# Patient Record
Sex: MALE | Race: OTHER | HISPANIC OR LATINO | URBAN - METROPOLITAN AREA
[De-identification: names, ages, dates, MRNs, and addresses within clinical notes are randomized per-mention and may not be internally consistent; named-entity substitution may affect disease eponyms.]

---

## 2018-04-22 VITALS
WEIGHT: 169.98 LBS | RESPIRATION RATE: 16 BRPM | TEMPERATURE: 97 F | DIASTOLIC BLOOD PRESSURE: 62 MMHG | HEART RATE: 89 BPM | OXYGEN SATURATION: 94 % | SYSTOLIC BLOOD PRESSURE: 105 MMHG

## 2018-04-22 PROCEDURE — 99285 EMERGENCY DEPT VISIT HI MDM: CPT | Mod: 25

## 2018-04-22 PROCEDURE — 93010 ELECTROCARDIOGRAM REPORT: CPT

## 2018-04-22 RX ORDER — TETANUS TOXOID, REDUCED DIPHTHERIA TOXOID AND ACELLULAR PERTUSSIS VACCINE, ADSORBED 5; 2.5; 8; 8; 2.5 [IU]/.5ML; [IU]/.5ML; UG/.5ML; UG/.5ML; UG/.5ML
0.5 SUSPENSION INTRAMUSCULAR ONCE
Qty: 0 | Refills: 0 | Status: COMPLETED | OUTPATIENT
Start: 2018-04-22 | End: 2018-04-22

## 2018-04-22 NOTE — ED ADULT TRIAGE NOTE - ARRIVAL INFO ADDITIONAL COMMENTS
[place], pt admits "he drank too much" and reports fall. noted with dried blood to mouth, hematoma to right side [place], [place], pt admits "he drank too much" and reports fall. noted with dried blood to mouth, hematoma to right side head. aaox1 with slurred [place], [place] per [place], pt stated "i drank too much" and reports fall. noted with dried blood to mouth, hematoma to right side occipital head. aaox1 to last name, with slurred speech

## 2018-04-23 ENCOUNTER — INPATIENT (INPATIENT)
Facility: HOSPITAL | Age: 48
LOS: 1 days | Discharge: HOME | DRG: 85 | End: 2018-04-25
Attending: PSYCHIATRY & NEUROLOGY | Admitting: PSYCHIATRY & NEUROLOGY
Payer: COMMERCIAL

## 2018-04-23 DIAGNOSIS — S01.01XA LACERATION WITHOUT FOREIGN BODY OF SCALP, INITIAL ENCOUNTER: ICD-10-CM

## 2018-04-23 DIAGNOSIS — F10.129 ALCOHOL ABUSE WITH INTOXICATION, UNSPECIFIED: ICD-10-CM

## 2018-04-23 DIAGNOSIS — R40.2411 GLASGOW COMA SCALE SCORE 13-15, IN THE FIELD [EMT OR AMBULANCE]: ICD-10-CM

## 2018-04-23 DIAGNOSIS — S06.350A TRAUMATIC HEMORRHAGE OF LEFT CEREBRUM WITHOUT LOSS OF CONSCIOUSNESS, INITIAL ENCOUNTER: ICD-10-CM

## 2018-04-23 DIAGNOSIS — I62.9 NONTRAUMATIC INTRACRANIAL HEMORRHAGE, UNSPECIFIED: ICD-10-CM

## 2018-04-23 DIAGNOSIS — S01.511A LACERATION WITHOUT FOREIGN BODY OF LIP, INITIAL ENCOUNTER: ICD-10-CM
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
## 2018-04-23 DIAGNOSIS — Z23 ENCOUNTER FOR IMMUNIZATION: ICD-10-CM
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
## 2018-04-23 DIAGNOSIS — Z29.9 ENCOUNTER FOR PROPHYLACTIC MEASURES, UNSPECIFIED: ICD-10-CM

## 2018-04-23 DIAGNOSIS — F10.10 ALCOHOL ABUSE, UNCOMPLICATED: ICD-10-CM

## 2018-04-23 DIAGNOSIS — G93.40 ENCEPHALOPATHY, UNSPECIFIED: ICD-10-CM

## 2018-04-23 LAB
ALBUMIN SERPL ELPH-MCNC: 4.5 G/DL — SIGNIFICANT CHANGE UP (ref 3.3–5)
ALP SERPL-CCNC: 106 U/L — SIGNIFICANT CHANGE UP (ref 40–120)
ALT FLD-CCNC: 22 U/L — SIGNIFICANT CHANGE UP (ref 10–45)
ANION GAP SERPL CALC-SCNC: 14 MMOL/L — SIGNIFICANT CHANGE UP (ref 5–17)
APPEARANCE UR: CLEAR — SIGNIFICANT CHANGE UP
APTT BLD: 34 SEC — SIGNIFICANT CHANGE UP (ref 27.5–37.4)
AST SERPL-CCNC: 23 U/L — SIGNIFICANT CHANGE UP (ref 10–40)
BASOPHILS NFR BLD AUTO: 0.4 % — SIGNIFICANT CHANGE UP (ref 0–2)
BILIRUB SERPL-MCNC: <0.2 MG/DL — SIGNIFICANT CHANGE UP (ref 0.2–1.2)
BILIRUB UR-MCNC: NEGATIVE — SIGNIFICANT CHANGE UP
BLD GP AB SCN SERPL QL: NEGATIVE — SIGNIFICANT CHANGE UP
BUN SERPL-MCNC: 12 MG/DL — SIGNIFICANT CHANGE UP (ref 7–23)
CALCIUM SERPL-MCNC: 9.1 MG/DL — SIGNIFICANT CHANGE UP (ref 8.4–10.5)
CHLORIDE SERPL-SCNC: 110 MMOL/L — HIGH (ref 96–108)
CHOLEST SERPL-MCNC: 175 MG/DL — SIGNIFICANT CHANGE UP (ref 10–199)
CO2 SERPL-SCNC: 22 MMOL/L — SIGNIFICANT CHANGE UP (ref 22–31)
COLOR SPEC: YELLOW — SIGNIFICANT CHANGE UP
CREAT SERPL-MCNC: 0.6 MG/DL — SIGNIFICANT CHANGE UP (ref 0.5–1.3)
DIFF PNL FLD: NEGATIVE — SIGNIFICANT CHANGE UP
EOSINOPHIL NFR BLD AUTO: 0.9 % — SIGNIFICANT CHANGE UP (ref 0–6)
ETHANOL SERPL-MCNC: 234 MG/DL — HIGH (ref 0–10)
GLUCOSE SERPL-MCNC: 119 MG/DL — HIGH (ref 70–99)
GLUCOSE UR QL: NEGATIVE — SIGNIFICANT CHANGE UP
HBA1C BLD-MCNC: 5.2 % — SIGNIFICANT CHANGE UP (ref 4–5.6)
HCT VFR BLD CALC: 44.4 % — SIGNIFICANT CHANGE UP (ref 39–50)
HDLC SERPL-MCNC: 66 MG/DL — SIGNIFICANT CHANGE UP (ref 40–125)
HGB BLD-MCNC: 15.2 G/DL — SIGNIFICANT CHANGE UP (ref 13–17)
INR BLD: 0.97 — SIGNIFICANT CHANGE UP (ref 0.88–1.16)
KETONES UR-MCNC: NEGATIVE — SIGNIFICANT CHANGE UP
LEUKOCYTE ESTERASE UR-ACNC: NEGATIVE — SIGNIFICANT CHANGE UP
LIPID PNL WITH DIRECT LDL SERPL: 95 MG/DL — SIGNIFICANT CHANGE UP
LYMPHOCYTES # BLD AUTO: 27.5 % — SIGNIFICANT CHANGE UP (ref 13–44)
MCHC RBC-ENTMCNC: 32.5 PG — SIGNIFICANT CHANGE UP (ref 27–34)
MCHC RBC-ENTMCNC: 34.2 G/DL — SIGNIFICANT CHANGE UP (ref 32–36)
MCV RBC AUTO: 94.9 FL — SIGNIFICANT CHANGE UP (ref 80–100)
MONOCYTES NFR BLD AUTO: 3.7 % — SIGNIFICANT CHANGE UP (ref 2–14)
NEUTROPHILS NFR BLD AUTO: 67.5 % — SIGNIFICANT CHANGE UP (ref 43–77)
NITRITE UR-MCNC: NEGATIVE — SIGNIFICANT CHANGE UP
PH UR: 5 — SIGNIFICANT CHANGE UP (ref 5–8)
PLATELET # BLD AUTO: 227 K/UL — SIGNIFICANT CHANGE UP (ref 150–400)
POTASSIUM SERPL-MCNC: 4 MMOL/L — SIGNIFICANT CHANGE UP (ref 3.5–5.3)
POTASSIUM SERPL-SCNC: 4 MMOL/L — SIGNIFICANT CHANGE UP (ref 3.5–5.3)
PROT SERPL-MCNC: 7.4 G/DL — SIGNIFICANT CHANGE UP (ref 6–8.3)
PROT UR-MCNC: NEGATIVE MG/DL — SIGNIFICANT CHANGE UP
PROTHROM AB SERPL-ACNC: 10.8 SEC — SIGNIFICANT CHANGE UP (ref 9.8–12.7)
RBC # BLD: 4.68 M/UL — SIGNIFICANT CHANGE UP (ref 4.2–5.8)
RBC # FLD: 12.3 % — SIGNIFICANT CHANGE UP (ref 10.3–16.9)
RH IG SCN BLD-IMP: POSITIVE — SIGNIFICANT CHANGE UP
SODIUM SERPL-SCNC: 146 MMOL/L — HIGH (ref 135–145)
SP GR SPEC: 1.02 — SIGNIFICANT CHANGE UP (ref 1–1.03)
T4 AB SER-ACNC: 5.88 UG/DL — SIGNIFICANT CHANGE UP (ref 3.17–11.72)
TOTAL CHOLESTEROL/HDL RATIO MEASUREMENT: 2.7 RATIO — LOW (ref 3.4–9.6)
TRIGL SERPL-MCNC: 68 MG/DL — SIGNIFICANT CHANGE UP (ref 10–149)
TSH SERPL-MCNC: 0.31 UIU/ML — LOW (ref 0.35–4.94)
UROBILINOGEN FLD QL: 0.2 E.U./DL — SIGNIFICANT CHANGE UP
WBC # BLD: 5.4 K/UL — SIGNIFICANT CHANGE UP (ref 3.8–10.5)
WBC # FLD AUTO: 5.4 K/UL — SIGNIFICANT CHANGE UP (ref 3.8–10.5)

## 2018-04-23 PROCEDURE — 72125 CT NECK SPINE W/O DYE: CPT | Mod: 26

## 2018-04-23 PROCEDURE — 99223 1ST HOSP IP/OBS HIGH 75: CPT

## 2018-04-23 PROCEDURE — 70450 CT HEAD/BRAIN W/O DYE: CPT | Mod: 26,77

## 2018-04-23 PROCEDURE — 99222 1ST HOSP IP/OBS MODERATE 55: CPT

## 2018-04-23 PROCEDURE — 70450 CT HEAD/BRAIN W/O DYE: CPT | Mod: 26

## 2018-04-23 RX ORDER — THIAMINE MONONITRATE (VIT B1) 100 MG
100 TABLET ORAL DAILY
Qty: 0 | Refills: 0 | Status: DISCONTINUED | OUTPATIENT
Start: 2018-04-23 | End: 2018-04-25

## 2018-04-23 RX ORDER — SODIUM CHLORIDE 9 MG/ML
1000 INJECTION INTRAMUSCULAR; INTRAVENOUS; SUBCUTANEOUS ONCE
Qty: 0 | Refills: 0 | Status: COMPLETED | OUTPATIENT
Start: 2018-04-23 | End: 2018-04-23

## 2018-04-23 RX ORDER — FOLIC ACID 0.8 MG
1 TABLET ORAL DAILY
Qty: 0 | Refills: 0 | Status: DISCONTINUED | OUTPATIENT
Start: 2018-04-23 | End: 2018-04-25

## 2018-04-23 RX ORDER — LEVETIRACETAM 250 MG/1
500 TABLET, FILM COATED ORAL EVERY 12 HOURS
Qty: 0 | Refills: 0 | Status: DISCONTINUED | OUTPATIENT
Start: 2018-04-23 | End: 2018-04-25

## 2018-04-23 RX ORDER — SODIUM CHLORIDE 9 MG/ML
1000 INJECTION, SOLUTION INTRAVENOUS
Qty: 0 | Refills: 0 | Status: COMPLETED | OUTPATIENT
Start: 2018-04-23 | End: 2018-04-23

## 2018-04-23 RX ORDER — HALOPERIDOL DECANOATE 100 MG/ML
5 INJECTION INTRAMUSCULAR ONCE
Qty: 0 | Refills: 0 | Status: COMPLETED | OUTPATIENT
Start: 2018-04-23 | End: 2018-04-23

## 2018-04-23 RX ADMIN — SODIUM CHLORIDE 1000 MILLILITER(S): 9 INJECTION INTRAMUSCULAR; INTRAVENOUS; SUBCUTANEOUS at 06:23

## 2018-04-23 RX ADMIN — LEVETIRACETAM 400 MILLIGRAM(S): 250 TABLET, FILM COATED ORAL at 15:03

## 2018-04-23 RX ADMIN — TETANUS TOXOID, REDUCED DIPHTHERIA TOXOID AND ACELLULAR PERTUSSIS VACCINE, ADSORBED 0.5 MILLILITER(S): 5; 2.5; 8; 8; 2.5 SUSPENSION INTRAMUSCULAR at 00:19

## 2018-04-23 RX ADMIN — Medication 25 MILLIGRAM(S): at 06:23

## 2018-04-23 RX ADMIN — Medication 1 MILLIGRAM(S): at 06:23

## 2018-04-23 RX ADMIN — Medication 1 MILLIGRAM(S): at 19:00

## 2018-04-23 RX ADMIN — HALOPERIDOL DECANOATE 5 MILLIGRAM(S): 100 INJECTION INTRAMUSCULAR at 00:19

## 2018-04-23 RX ADMIN — Medication 2 MILLIGRAM(S): at 00:19

## 2018-04-23 RX ADMIN — SODIUM CHLORIDE 125 MILLILITER(S): 9 INJECTION, SOLUTION INTRAVENOUS at 07:18
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
## 2018-04-23 NOTE — ED PROVIDER NOTE - OBJECTIVE STATEMENT
The pt is a 48 y/o M, BIBA, for intox -- admits to drinking, but unable to quantify amount. + bleeding, last Td?  Denies any cp, sob, n/v, ha

## 2018-04-23 NOTE — ED PROVIDER NOTE - SHIFT CHANGE DETAILS
pending repeat ct - if no change in size of hemorrhage to be dc'd, if increase in size then neurosurg to re eval and dispo
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
## 2018-04-23 NOTE — ED PROVIDER NOTE - ENMT, MLM
Airway patent, Nasal mucosa clear. Mouth with normal mucosa. a 1 cm lac to inner lower lip, no active bleeding, no dental fx. Throat has no vesicles, no oropharyngeal exudates and uvula is midline. ears: no hemotympanum b/l

## 2018-04-23 NOTE — H&P ADULT - ASSESSMENT
48 yo male BIBEMS for etoh intoxication found to have intraparenchymal bleed. admitted to [facility] for further monitoring,.

## 2018-04-23 NOTE — ED ADULT NURSE REASSESSMENT NOTE - NS ED NURSE REASSESS COMMENT FT1
received report from Milagro WILSON, pt received on cardiac monitor, sleeping at this time, responsive to verbal stimuli. Respirations easy, unlabored.  pt has condom catheter in place and IV bannana bag infusing.  Will continue to monitor patient.

## 2018-04-23 NOTE — H&P ADULT - NSHPSOCIALHISTORY_GEN_ALL_CORE
Smoke; denies  Drugs: Denies  Etoh: 12 packs of beer occasioanlly; stated every few weeks he will do this, but has not done in a while.   Work: Works in restaurant.

## 2018-04-23 NOTE — ED ADULT NURSE NOTE - OBJECTIVE STATEMENT
pt. presented with c/o etoh intoxication, as per ems, [place] the [place], pt. admits to drinking etoh tonight, dry blood noted to pt's mouth and swelling with abrasion noted to the back of head on the Rt side. pt. is awake, non-cooperative, unable to provide history, pt. had 2 episodes of urinary incontinence. meds administered, awaiting ct scan. O2 sat monitor in progress.

## 2018-04-23 NOTE — H&P ADULT - NSHPLABSRESULTS_GEN_ALL_CORE
.  LABS:                         15.2   5.4   )-----------( 227      ( 2018 01:42 )             44.4         146<H>  |  110<H>  |  12  ----------------------------<  119<H>  4.0   |  22  |  0.60    Ca    9.1      2018 01:42    TPro  7.4  /  Alb  4.5  /  TBili  <0.2  /  DBili  x   /  AST  23  /  ALT  22  /  AlkPhos  106      PT/INR - ( 2018 01:42 )   PT: 10.8 sec;   INR: 0.97          PTT - ( 2018 01:42 )  PTT:34.0 sec  Urinalysis Basic - ( 2018 12:00 )    Color: Yellow / Appearance: Clear / S.025 / pH: x  Gluc: x / Ketone: NEGATIVE  / Bili: Negative / Urobili: 0.2 E.U./dL   Blood: x / Protein: NEGATIVE mg/dL / Nitrite: NEGATIVE   Leuk Esterase: NEGATIVE / RBC: x / WBC x   Sq Epi: x / Non Sq Epi: x / Bacteria: x                RADIOLOGY, EKG & ADDITIONAL TESTS: Reviewed.

## 2018-04-23 NOTE — ED ADULT NURSE REASSESSMENT NOTE - NS ED NURSE REASSESS COMMENT FT1
pt. woke up, oriented to self and place, breathing unlabored, cardiac monitor in progress, sinus tachycardia noted, ekg done, pt. was evaluated by md hill, meds in progress, will monitor.

## 2018-04-23 NOTE — H&P ADULT - ATTENDING COMMENTS
Patient seen and examined while in ED.  Agree with above with following points -   Patient found to have left basal ganglia ICH that could be due to small vessel disease, though can't rule out that caused by contusion.  More probable that he had the hemorrhage and then fell.  He has some right sided pain that may have resulted post fall.    PMH unknown since he hasn't had medical treatments in 30 years, as per his wife  No known surgeries  All: NKDA  Meds: none at home  FHx: mom - DM, HTN; dad - TB  SH: alcohol abuse years ago (admission in 2015 with alcohol level over 200 but none at [facility] since then)    O: General - lying in bed, sleepy, NAD  Face: dried blood around his lower face  CV: RRR  Extremities: 2+ radial pulses bilaterally, some tenderness in his right UE and shoulder but FROM  Neuro - MS - wakes up slowly with prodding, slow to answer but able name, repeat, follows 2-step commands, no dysarthria, able tell history  CN - EOMI, PERRL+, VFF, no nystagmus, facial sens intact, no facial droop, palatal elevation intact, tongue midline  Motor - Right pronator drift, Right UE 4-/5, otherwise 5/5, normal bulk and tone  Sensory - Intact light touch bilaterally  Cerebellar - finger-nose intact bilaterally  Gait - deferred  NIHSS - 1 for left arm weakness  no tPA secondary to hemorrhage      CT Head No Cont (04.23.18 @ 07:40) >    1.5 cm left intrathalamic hematoma, slightly increased in size fromprior   study where it measures 1.1 cm. Interval appearance of a tiny amount of   acute hemorrhage seen along the left perimesencephalic cistern and left   incisura.    Previously seen focus of high attenuation along the left anterior frontal   convexity is no longer visualized.    Assessment: 47 year-old male found altered with elevated alcohol level but also has ICH on cerebral imaging so unclear of full etiology.  Plan:  1) Secondary stroke prevention - No antiplatelet or anticoagulant secondary to ICH  2) Stroke workup -   a) Echocardiogram with bubble  b) PT eval   c) Reassess need for MRI Brain tomorrow once more awake  d) hemoglobin A1C and lipid profile - reassess need for statin once have lipid profile since may not be indicated in setting of ICH    3) CIWA protocol to avoid alcohol withdrawal.  4) DVT prophylaxis - SCDs; no heparin SQ due to hemorrhage.    Answered his wife's questions  Discussed with Dr. Amaya, ED Attending

## 2018-04-23 NOTE — H&P ADULT - PROBLEM SELECTOR PLAN 2
As above admitted with etoh intoxication; never withdrawal before and never intubated. Reveived ativan and librium in ER.   -Ciwa currently ~3-4.   -Not on standing librium or ativan. CIWA checks frequently and if increasing signs of withdrawal will dose for librium and ativan.

## 2018-04-23 NOTE — ED PROVIDER NOTE - ATTENDING CONTRIBUTION TO CARE
47 yom pw ams, admits to EtOH, limited history, noted trauma to head.    agree w/ PA, abrasion to R scalp, inner lip lac (small), will ct, pt agitated, will sedate for medical eval

## 2018-04-23 NOTE — ED PROVIDER NOTE - PROGRESS NOTE DETAILS
neurosurg consulted given parenchymal bleed - recommending repeat ct in 6 hrs neurosurg consulted given parenchymal bleed - recommending repeat ct in 6 hrs (7 am) pt became tachy and diaphoretic - will medicate for withdrawal and give ivf, no tremors/no n/v seen by stroke attending, to admit [facility]  pts wife at bedside, found pt by using find my iphone as worried sick pt never came home last night. wife updated on his care.

## 2018-04-23 NOTE — ED PROVIDER NOTE - MEDICAL DECISION MAKING DETAILS
etoh intox w/abrasion to scalp and lac to inner lip, pt combative and required sedation for own safety (kept attempting to get out of stretcher but too intoxicated to ambulate), ct head / cspine done and found to have intracranial hemorrhage - neurosurg consulted and recommending repeat scan in  6 hrs - if unchanged then dc otherwise will re eval

## 2018-04-23 NOTE — ED ADULT NURSE NOTE - CHPI ED SYMPTOMS NEG
no abdominal pain/no nausea/no fever/no abdominal distension/no pain/no vomiting/no chills/no weakness

## 2018-04-23 NOTE — ED PROVIDER NOTE - CARE PLAN
Principal Discharge DX:	Alcohol intoxication  Secondary Diagnosis:	Alcohol abuse  Secondary Diagnosis:	Intracranial hemorrhage
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
## 2018-04-23 NOTE — H&P ADULT - PROBLEM SELECTOR PLAN 1
Presenting with etoh intoxication and found to have ICH on CT scan which progressively became larger.   - f/u neurosurgery recs: Repeat CT in AM  -Lipid profile, tsh, a1c  -check folate, b12 level.   -PT to see.

## 2018-04-24 DIAGNOSIS — R63.8 OTHER SYMPTOMS AND SIGNS CONCERNING FOOD AND FLUID INTAKE: ICD-10-CM

## 2018-04-24 LAB
ANION GAP SERPL CALC-SCNC: 9 MMOL/L — SIGNIFICANT CHANGE UP (ref 5–17)
BUN SERPL-MCNC: 13 MG/DL — SIGNIFICANT CHANGE UP (ref 7–23)
CALCIUM SERPL-MCNC: 8.6 MG/DL — SIGNIFICANT CHANGE UP (ref 8.4–10.5)
CHLORIDE SERPL-SCNC: 104 MMOL/L — SIGNIFICANT CHANGE UP (ref 96–108)
CO2 SERPL-SCNC: 28 MMOL/L — SIGNIFICANT CHANGE UP (ref 22–31)
CREAT SERPL-MCNC: 0.67 MG/DL — SIGNIFICANT CHANGE UP (ref 0.5–1.3)
FOLATE SERPL-MCNC: >20 NG/ML — SIGNIFICANT CHANGE UP
GLUCOSE BLDC GLUCOMTR-MCNC: 95 MG/DL — SIGNIFICANT CHANGE UP (ref 70–99)
GLUCOSE SERPL-MCNC: 102 MG/DL — HIGH (ref 70–99)
HCT VFR BLD CALC: 42.8 % — SIGNIFICANT CHANGE UP (ref 39–50)
HGB BLD-MCNC: 14.3 G/DL — SIGNIFICANT CHANGE UP (ref 13–17)
MAGNESIUM SERPL-MCNC: 2 MG/DL — SIGNIFICANT CHANGE UP (ref 1.6–2.6)
MCHC RBC-ENTMCNC: 31.8 PG — SIGNIFICANT CHANGE UP (ref 27–34)
MCHC RBC-ENTMCNC: 33.4 G/DL — SIGNIFICANT CHANGE UP (ref 32–36)
MCV RBC AUTO: 95.1 FL — SIGNIFICANT CHANGE UP (ref 80–100)
PLATELET # BLD AUTO: 212 K/UL — SIGNIFICANT CHANGE UP (ref 150–400)
POTASSIUM SERPL-MCNC: 3.6 MMOL/L — SIGNIFICANT CHANGE UP (ref 3.5–5.3)
POTASSIUM SERPL-SCNC: 3.6 MMOL/L — SIGNIFICANT CHANGE UP (ref 3.5–5.3)
RBC # BLD: 4.5 M/UL — SIGNIFICANT CHANGE UP (ref 4.2–5.8)
RBC # FLD: 12.2 % — SIGNIFICANT CHANGE UP (ref 10.3–16.9)
SODIUM SERPL-SCNC: 141 MMOL/L — SIGNIFICANT CHANGE UP (ref 135–145)
T3 SERPL-MCNC: 109 NG/DL — SIGNIFICANT CHANGE UP (ref 80–200)
VIT B12 SERPL-MCNC: 805 PG/ML — SIGNIFICANT CHANGE UP (ref 232–1245)
WBC # BLD: 8.6 K/UL — SIGNIFICANT CHANGE UP (ref 3.8–10.5)
WBC # FLD AUTO: 8.6 K/UL — SIGNIFICANT CHANGE UP (ref 3.8–10.5)

## 2018-04-24 PROCEDURE — 99233 SBSQ HOSP IP/OBS HIGH 50: CPT

## 2018-04-24 PROCEDURE — 70450 CT HEAD/BRAIN W/O DYE: CPT | Mod: 26,59

## 2018-04-24 PROCEDURE — 70496 CT ANGIOGRAPHY HEAD: CPT | Mod: 26

## 2018-04-24 PROCEDURE — 93306 TTE W/DOPPLER COMPLETE: CPT | Mod: 26

## 2018-04-24 PROCEDURE — 70498 CT ANGIOGRAPHY NECK: CPT | Mod: 26

## 2018-04-24 RX ORDER — POTASSIUM CHLORIDE 20 MEQ
40 PACKET (EA) ORAL ONCE
Qty: 0 | Refills: 0 | Status: DISCONTINUED | OUTPATIENT
Start: 2018-04-24 | End: 2018-04-24

## 2018-04-24 RX ORDER — POTASSIUM CHLORIDE 20 MEQ
10 PACKET (EA) ORAL
Qty: 0 | Refills: 0 | Status: COMPLETED | OUTPATIENT
Start: 2018-04-24 | End: 2018-04-24

## 2018-04-24 RX ADMIN — Medication 100 MILLIEQUIVALENT(S): at 18:43

## 2018-04-24 RX ADMIN — Medication 1 MILLIGRAM(S): at 15:42

## 2018-04-24 RX ADMIN — LEVETIRACETAM 400 MILLIGRAM(S): 250 TABLET, FILM COATED ORAL at 15:41

## 2018-04-24 RX ADMIN — Medication 100 MILLIGRAM(S): at 15:42

## 2018-04-24 RX ADMIN — Medication 100 MILLIEQUIVALENT(S): at 21:55

## 2018-04-24 RX ADMIN — LEVETIRACETAM 400 MILLIGRAM(S): 250 TABLET, FILM COATED ORAL at 04:03

## 2018-04-24 NOTE — PROGRESS NOTE ADULT - PROBLEM SELECTOR PLAN 1
Presenting with etoh intoxication and noted to have intraparenchymal hemorrhage on CT and noted increasing size on repeat. Neurosurgery following. Pending repeat imaging CTH as well as CTA Head/neck. TChol- 175/LDL 95/HDL 66/A1c 5.2/ TSH 0.314 with T4 at 5.88.  - pending CTH/CTA head and neck Presenting with etoh intoxication and noted to have intraparenchymal hemorrhage on CT and noted increasing size on repeat. Neurosurgery following. Pending repeat imaging CTH as well as CTA Head/neck. TChol- 175/LDL 95/HDL 66/A1c 5.2/ TSH 0.314 with T4 at 5.88.  - CTH demonstrates stable ICH  - Pending CTA Head/neck read
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
## 2018-04-24 NOTE — PROGRESS NOTE ADULT - PROBLEM SELECTOR PLAN 2
Noted to have ETOH intoxication on presentation (BILL 234) and reports occassionally having 12 beers in setting. Never withdrawn in past/no hospitalizations for ETOH. Received librium 25mg in ED. CIWA 3-4 on presentation.   -CIWA 1 on exam this AM. C/w Monitoring  -not currently on standing librium but will continue to monitor and can add if CIWA elevated.  -c/w folic acid and thiamine (IV pending S/S eval)

## 2018-04-24 NOTE — PROGRESS NOTE ADULT - PROBLEM SELECTOR PLAN 4
VTE: No HSQ in setting of ICH, SCDs    FULL CODE    Dispo: Admitted to [facility] for further neurological monitoring in setting of ICH and monitoring CIWA for concern of potential ETOH withdrawal.

## 2018-04-24 NOTE — PROGRESS NOTE ADULT - PROBLEM SELECTOR PLAN 3
F: No IVF at this time, if continues to remain NPO will start IVF.  E: Replete electrolytes to maintain K>4 and Mg>2  N:   Diet pending s/s eval.

## 2018-04-24 NOTE — PROGRESS NOTE ADULT - SUBJECTIVE AND OBJECTIVE BOX
PROGRESS NOTE    CC: Found down, Intracranial hemorrhage, ETOH intoxication  Overnight Events: CIWA 0 o/n.  Interval History: CIWA 1 on exam for orientation to date. No complaints this AM. No headaches, dizziness, shortness of breath, chest pain, abdominal pain, nausea, no vomiting. No tremor. No tactile or visual hallucinations.   ROS: As above.    OBJECTIVE  Vitals:  T(C): 36.7 (18 @ 05:14), Max: 37.4 (18 @ 11:18)  HR: 72 (18 @ 04:16) (72 - 108)  BP: 101/63 (18 @ 04:16) (100/61 - 128/78)  RR: 14 (18 @ 04:16) (14 - 19)  SpO2: 96% (18 @ 04:16) (96% - 100%)  Wt(kg): --    I/O:  I&O's Summary      PHYSICAL EXAM:  Appearance: Unkept, foul smelling though appears stated age. Comfortable in NAD. No respiratory distress. Speaking in full sentences.   HEENT:  PERRL. Anicteric sclera. No pallor noted. Conjunctiva clear b/l. Moist oral mucosa.  Cardiovascular: Mild tachycardia with regular rhythm. S1, S2 with no murmurs.  Respiratory: Lungs CTAB.   Gastrointestinal:  Soft, nontender. Non-distended. Non-rigid.	  Extremities: No edema b/l. No erythema b/l. LE WWP b/l.  Vascular: DP 2+ b/l.  Neurologic:  Alert and awake. Moving all extremities 5/5 strength. Facial muscles moving symmetrically. Sensation intact. No tongue fasiculations, No tongue deviation, soft palate rise symmetrically. Following commands. Making eye contact.  	  LABS:                        15.2   5.4   )-----------( 227      ( 2018 01:42 )             44.4     04-    146<H>  |  110<H>  |  12  ----------------------------<  119<H>  4.0   |  22  |  0.60    Ca    9.1      2018 01:42    TPro  7.4  /  Alb  4.5  /  TBili  <0.2  /  DBili  x   /  AST  23  /  ALT  22  /  AlkPhos  106  04-23    PT/INR - ( 2018 01:42 )   PT: 10.8 sec;   INR: 0.97          PTT - ( 2018 01:42 )  PTT:34.0 sec  Urinalysis Basic - ( 2018 12:00 )    Color: Yellow / Appearance: Clear / S.025 / pH: x  Gluc: x / Ketone: NEGATIVE  / Bili: Negative / Urobili: 0.2 E.U./dL   Blood: x / Protein: NEGATIVE mg/dL / Nitrite: NEGATIVE   Leuk Esterase: NEGATIVE / RBC: x / WBC x   Sq Epi: x / Non Sq Epi: x / Bacteria: x        RADIOLOGY & ADDITIONAL TESTS:  Reviewed .    MEDICATIONS  (STANDING):  folic acid Injectable 1 milliGRAM(s) IV Push daily  levETIRAcetam  IVPB 500 milliGRAM(s) IV Intermittent every 12 hours  thiamine Injectable 100 milliGRAM(s) IV Push daily PROGRESS NOTE    CC: Found down, Intracranial hemorrhage, ETOH intoxication  Overnight Events: CIWA 0 o/n.  Interval History: CIWA 1 on exam for orientation to date. No complaints this AM. No headaches, dizziness, shortness of breath, chest pain, abdominal pain, nausea, no vomiting. No tremor. No tactile or visual hallucinations.   ROS: As above.    OBJECTIVE  Vitals:  T(C): 36.7 (18 @ 05:14), Max: 37.4 (18 @ 11:18)  HR: 72 (18 @ 04:16) (72 - 108)  BP: 101/63 (18 @ 04:16) (100/61 - 128/78)  RR: 14 (18 @ 04:16) (14 - 19)  SpO2: 96% (18 @ 04:16) (96% - 100%)  Wt(kg): --    I/O:  I&O's Summary      PHYSICAL EXAM:  Appearance: Unkept, foul smelling though appears stated age. Comfortable in NAD. No respiratory distress. Speaking in full sentences.   HEENT:  PERRL. Anicteric sclera. No pallor noted. Conjunctiva clear b/l. Moist oral mucosa.  Cardiovascular: Mild tachycardia with regular rhythm. S1, S2 with no murmurs.  Respiratory: Lungs CTAB.   Gastrointestinal:  Soft, nontender. Non-distended. Non-rigid.	  Extremities: No edema b/l. No erythema b/l. LE WWP b/l.  Vascular: DP 2+ b/l.  Neurologic:  Alert and awake. Moving all extremities 5/5 strength. Facial muscles moving symmetrically. Sensation intact. No tongue fasiculations, No tongue deviation, soft palate rise symmetrically. Following commands. Making eye contact. No noted resting tremor.  	  LABS:                        15.2   5.4   )-----------( 227      ( 2018 01:42 )             44.4     -    146<H>  |  110<H>  |  12  ----------------------------<  119<H>  4.0   |  22  |  0.60    Ca    9.1      2018 01:42    TPro  7.4  /  Alb  4.5  /  TBili  <0.2  /  DBili  x   /  AST  23  /  ALT  22  /  AlkPhos  106  04-23    PT/INR - ( 2018 01:42 )   PT: 10.8 sec;   INR: 0.97          PTT - ( 2018 01:42 )  PTT:34.0 sec  Urinalysis Basic - ( 2018 12:00 )    Color: Yellow / Appearance: Clear / S.025 / pH: x  Gluc: x / Ketone: NEGATIVE  / Bili: Negative / Urobili: 0.2 E.U./dL   Blood: x / Protein: NEGATIVE mg/dL / Nitrite: NEGATIVE   Leuk Esterase: NEGATIVE / RBC: x / WBC x   Sq Epi: x / Non Sq Epi: x / Bacteria: x        RADIOLOGY & ADDITIONAL TESTS:  Reviewed .    MEDICATIONS  (STANDING):  folic acid Injectable 1 milliGRAM(s) IV Push daily  levETIRAcetam  IVPB 500 milliGRAM(s) IV Intermittent every 12 hours  thiamine Injectable 100 milliGRAM(s) IV Push daily
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
## 2018-04-25 ENCOUNTER — TRANSCRIPTION ENCOUNTER (OUTPATIENT)
Age: 48
End: 2018-04-25

## 2018-04-25 VITALS — SYSTOLIC BLOOD PRESSURE: 104 MMHG | HEART RATE: 76 BPM | DIASTOLIC BLOOD PRESSURE: 68 MMHG | OXYGEN SATURATION: 99 %

## 2018-04-25 PROBLEM — Z00.00 ENCOUNTER FOR PREVENTIVE HEALTH EXAMINATION: Status: ACTIVE | Noted: 2018-04-25

## 2018-04-25 LAB
ANION GAP SERPL CALC-SCNC: 13 MMOL/L — SIGNIFICANT CHANGE UP (ref 5–17)
BUN SERPL-MCNC: 16 MG/DL — SIGNIFICANT CHANGE UP (ref 7–23)
CALCIUM SERPL-MCNC: 9 MG/DL — SIGNIFICANT CHANGE UP (ref 8.4–10.5)
CHLORIDE SERPL-SCNC: 100 MMOL/L — SIGNIFICANT CHANGE UP (ref 96–108)
CO2 SERPL-SCNC: 24 MMOL/L — SIGNIFICANT CHANGE UP (ref 22–31)
CREAT SERPL-MCNC: 0.61 MG/DL — SIGNIFICANT CHANGE UP (ref 0.5–1.3)
GLUCOSE SERPL-MCNC: 167 MG/DL — HIGH (ref 70–99)
MAGNESIUM SERPL-MCNC: 2.1 MG/DL — SIGNIFICANT CHANGE UP (ref 1.6–2.6)
POTASSIUM SERPL-MCNC: 4 MMOL/L — SIGNIFICANT CHANGE UP (ref 3.5–5.3)
POTASSIUM SERPL-SCNC: 4 MMOL/L — SIGNIFICANT CHANGE UP (ref 3.5–5.3)
SODIUM SERPL-SCNC: 137 MMOL/L — SIGNIFICANT CHANGE UP (ref 135–145)

## 2018-04-25 PROCEDURE — 99233 SBSQ HOSP IP/OBS HIGH 50: CPT

## 2018-04-25 RX ORDER — LEVETIRACETAM 250 MG/1
1 TABLET, FILM COATED ORAL
Qty: 60 | Refills: 0 | OUTPATIENT
Start: 2018-04-25 | End: 2018-05-24

## 2018-04-25 RX ORDER — THIAMINE MONONITRATE (VIT B1) 100 MG
100 TABLET ORAL DAILY
Qty: 0 | Refills: 0 | Status: DISCONTINUED | OUTPATIENT
Start: 2018-04-25 | End: 2018-04-25

## 2018-04-25 RX ORDER — FOLIC ACID 0.8 MG
1 TABLET ORAL DAILY
Qty: 0 | Refills: 0 | Status: DISCONTINUED | OUTPATIENT
Start: 2018-04-25 | End: 2018-04-25

## 2018-04-25 RX ADMIN — LEVETIRACETAM 400 MILLIGRAM(S): 250 TABLET, FILM COATED ORAL at 02:42

## 2018-04-25 RX ADMIN — Medication 100 MILLIEQUIVALENT(S): at 23:54

## 2018-04-25 RX ADMIN — Medication 100 MILLIGRAM(S): at 12:20

## 2018-04-25 RX ADMIN — Medication 1 MILLIGRAM(S): at 12:20

## 2018-04-25 RX ADMIN — LEVETIRACETAM 400 MILLIGRAM(S): 250 TABLET, FILM COATED ORAL at 14:33

## 2018-04-25 NOTE — PROGRESS NOTE ADULT - PROBLEM SELECTOR PLAN 3
F: No IVF at this time, PO intake.  E: Replete electrolytes to maintain K>4 and Mg>2  N: Passed dysphagia o/n and started on Regular diet
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
## 2018-04-25 NOTE — DISCHARGE NOTE ADULT - PLAN OF CARE
Improve symptoms. Discharge home with home PT. You were found to have a left thalamic and incisura hemorrhage. It is unclear the mechanism of your injury whether you hit your head or a small vessel causing hemorrhage. Imaging was performed with a CT of head that demonstrated a stable bleed (no longer increasing in size- initially small increase in size but now decreasing). You also had a CT angiogram of Head and neck for which demonstrated no abnormalities. You will be sent home on Keppra. You were made an appointment with [facility] on May 8th at 10AM for which you will need to receive a referral for neurology to go to [facility] or [place]. Please call [phone] for your appointment at [facility]. The number for [facility]/[place] to make an appointment is [phone] after receiving a referral. You were also sent home a prescription of Keppra for 500mg tablets twice a day. The medication was sent to your pharmacy [facility]. You were found to be intoxicated with alcohol with an alcohol level to 234. Based on the number of drinks you reported, we are concerned that you are at risk for alcohol abuse. For additional support you can Alcoholics Anonymous for meetings and support:  [phone].

## 2018-04-25 NOTE — DISCHARGE NOTE ADULT - CARE PLAN
Principal Discharge DX:	Intracranial hemorrhage  Secondary Diagnosis:	Abuse, drug or alcohol Principal Discharge DX:	Intracranial hemorrhage  Goal:	Improve symptoms. Discharge home with home PT.  Assessment and plan of treatment:	You were found to have a left thalamic and incisura hemorrhage. It is unclear the mechanism of your injury whether you hit your head or a small vessel causing hemorrhage. Imaging was performed with a CT of head that demonstrated a stable bleed (no longer increasing in size- initially small increase in size but now decreasing). You also had a CT angiogram of Head and neck for which demonstrated no abnormalities. You will be sent home on Keppra. You were made an appointment with [facility] on May 8th at 10AM for which you will need to receive a referral for neurology to go to [facility] or [place]. Please call [phone] for your appointment at [facility]. The number for [facility] to make an appointment is [phone] after receiving a referral. You were also sent home a prescription of Keppra for 500mg tablets twice a day. The medication was sent to your pharmacy [facility].  Secondary Diagnosis:	Abuse, drug or alcohol  Assessment and plan of treatment:	You were found to be intoxicated with alcohol with an alcohol level to 234. Based on the number of drinks you reported, we are concerned that you are at risk for alcohol abuse. For additional support you can Alcoholics Anonymous for meetings and support:  [phone].
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
## 2018-04-25 NOTE — DISCHARGE NOTE ADULT - ADDITIONAL INSTRUCTIONS
You were made an appointment at [facility] on May 8 at 10AM located at [address] [place] [address]. You will need to obtain a neurology referral at this appointment for follow up within 1 month of discharge.

## 2018-04-25 NOTE — DISCHARGE NOTE ADULT - CARE PROVIDERS DIRECT ADDRESSES
[email or web address]_Unknown

## 2018-04-25 NOTE — DISCHARGE NOTE ADULT - PATIENT PORTAL LINK FT
You can access the [facility], offered by [facility], by registering with the following website: [email or web address]
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
## 2018-04-25 NOTE — PROGRESS NOTE ADULT - PROBLEM SELECTOR PLAN 1
Presented with etoh intoxication (BILL 234) and noted to have intraparenchymal hemorrhage on CT and noted increasing size on repeat. Neurosurgery following. Repeat CTH notable for stable hemorrhage. CTA Head and neck WNL. TChol- 175/LDL 95/HDL 66/A1c 5.2/ TSH 0.314 with T4 at 5.88.  -Neuro exam improving. Patient less lethargic today. Following commands. No noted weakness/deficits. 5/5 strength bilaterally with no facial droop and passed repeat bedside dysphagia (failed yesterday). Presented with etoh intoxication (BILL 234) and noted to have intraparenchymal hemorrhage on CT and noted increasing size on repeat. Neurosurgery following. Repeat CTH notable for stable hemorrhage. CTA Head and neck WNL. TChol- 175/LDL 95/HDL 66/A1c 5.2/ TSH 0.314 with T4 at 5.88.  -Neuro exam improving. Patient less lethargic today. Following commands. No noted weakness/deficits. 5/5 strength bilaterally with no facial droop and passed repeat bedside dysphagia (failed yesterday). Restarted diet.

## 2018-04-25 NOTE — DISCHARGE NOTE ADULT - MEDICATION SUMMARY - MEDICATIONS TO TAKE
I will START or STAY ON the medications listed below when I get home from the hospital:    levETIRAcetam 500 mg oral tablet  -- 1 tab(s) by mouth 2 times a day  -- Indication: For Intracranial hemorrhage Prophylaxis

## 2018-04-25 NOTE — PROGRESS NOTE ADULT - PROBLEM SELECTOR PLAN 2
Noted to have ETOH intoxication on presentation (BILL 234) and reports occassionally having 12 beers in setting. Never withdrawn in past/no hospitalizations for ETOH. Received librium 25mg in ED. CIWA 3-4 on presentation.   -CIWA 0. C/w Monitoring (Day 3)  -not currently on standing librium but will continue to monitor and can add if CIWA elevated.  -c/w folic acid and thiamine (IV pending S/S eval) Noted to have ETOH intoxication on presentation (BILL 234) and reports occassionally having 12 beers in setting. Never withdrawn in past/no hospitalizations for ETOH. Received librium 25mg in ED. CIWA 3-4 on presentation.   -CIWA 0. C/w Monitoring (Day 3)  -not currently on standing librium but will continue to monitor and can add if CIWA elevated.  -c/w folic acid and thiamine, will switch to PO

## 2018-04-25 NOTE — DISCHARGE NOTE ADULT - PROVIDER TOKENS
TOKEN:'[id number]',FREE:[LAST:[[facility]/[place]],PHONE:[(   )    -],FAX:[(   )    -],ADDRESS:[[facility]  [phone]]]

## 2018-04-25 NOTE — DISCHARGE NOTE ADULT - CARE PROVIDER_API CALL
Tika Oleary), Internal Medicine  [address]  [address] [place] [address]  Phone: [phone]  Fax: [phone]    [facility]  [phone]  Phone: (   )    -  Fax: (   )    -
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
## 2018-05-08 ENCOUNTER — APPOINTMENT (OUTPATIENT)
Dept: INTERNAL MEDICINE | Facility: CLINIC | Age: 48
End: 2018-05-08
Payer: MEDICARE

## 2018-05-08 VITALS
OXYGEN SATURATION: 96 % | HEART RATE: 71 BPM | WEIGHT: 150 LBS | BODY MASS INDEX: 25.61 KG/M2 | TEMPERATURE: 98.4 F | DIASTOLIC BLOOD PRESSURE: 61 MMHG | SYSTOLIC BLOOD PRESSURE: 101 MMHG | HEIGHT: 64 IN

## 2018-05-08 DIAGNOSIS — I61.9 NONTRAUMATIC INTRACEREBRAL HEMORRHAGE, UNSPECIFIED: ICD-10-CM

## 2018-05-08 DIAGNOSIS — R53.1 WEAKNESS: ICD-10-CM

## 2018-05-08 DIAGNOSIS — Z78.9 OTHER SPECIFIED HEALTH STATUS: ICD-10-CM

## 2018-05-08 PROCEDURE — 99203 OFFICE O/P NEW LOW 30 MIN: CPT | Mod: GC

## 2018-05-08 RX ORDER — LEVETIRACETAM 500 MG/1
500 TABLET, FILM COATED ORAL TWICE DAILY
Refills: 0 | Status: ACTIVE | COMMUNITY

## 2018-05-23 PROCEDURE — 84436 ASSAY OF TOTAL THYROXINE: CPT

## 2018-05-23 PROCEDURE — 90715 TDAP VACCINE 7 YRS/> IM: CPT

## 2018-05-23 PROCEDURE — 83735 ASSAY OF MAGNESIUM: CPT

## 2018-05-23 PROCEDURE — 97116 GAIT TRAINING THERAPY: CPT

## 2018-05-23 PROCEDURE — 90471 IMMUNIZATION ADMIN: CPT

## 2018-05-23 PROCEDURE — 82962 GLUCOSE BLOOD TEST: CPT

## 2018-05-23 PROCEDURE — 85610 PROTHROMBIN TIME: CPT

## 2018-05-23 PROCEDURE — 83036 HEMOGLOBIN GLYCOSYLATED A1C: CPT

## 2018-05-23 PROCEDURE — 36415 COLL VENOUS BLD VENIPUNCTURE: CPT

## 2018-05-23 PROCEDURE — 93005 ELECTROCARDIOGRAM TRACING: CPT

## 2018-05-23 PROCEDURE — 86900 BLOOD TYPING SEROLOGIC ABO: CPT

## 2018-05-23 PROCEDURE — 85027 COMPLETE CBC AUTOMATED: CPT

## 2018-05-23 PROCEDURE — 85730 THROMBOPLASTIN TIME PARTIAL: CPT

## 2018-05-23 PROCEDURE — 96374 THER/PROPH/DIAG INJ IV PUSH: CPT

## 2018-05-23 PROCEDURE — 86850 RBC ANTIBODY SCREEN: CPT

## 2018-05-23 PROCEDURE — 84480 ASSAY TRIIODOTHYRONINE (T3): CPT

## 2018-05-23 PROCEDURE — 96372 THER/PROPH/DIAG INJ SC/IM: CPT | Mod: XU

## 2018-05-23 PROCEDURE — 80061 LIPID PANEL: CPT

## 2018-05-23 PROCEDURE — 96375 TX/PRO/DX INJ NEW DRUG ADDON: CPT

## 2018-05-23 PROCEDURE — 86901 BLOOD TYPING SEROLOGIC RH(D): CPT

## 2018-05-23 PROCEDURE — 93306 TTE W/DOPPLER COMPLETE: CPT

## 2018-05-23 PROCEDURE — 82607 VITAMIN B-12: CPT

## 2018-05-23 PROCEDURE — 80307 DRUG TEST PRSMV CHEM ANLYZR: CPT

## 2018-05-23 PROCEDURE — 70498 CT ANGIOGRAPHY NECK: CPT

## 2018-05-23 PROCEDURE — 97161 PT EVAL LOW COMPLEX 20 MIN: CPT

## 2018-05-23 PROCEDURE — 72125 CT NECK SPINE W/O DYE: CPT

## 2018-05-23 PROCEDURE — 85025 COMPLETE CBC W/AUTO DIFF WBC: CPT

## 2018-05-23 PROCEDURE — 70496 CT ANGIOGRAPHY HEAD: CPT

## 2018-05-23 PROCEDURE — 99285 EMERGENCY DEPT VISIT HI MDM: CPT | Mod: 25

## 2018-05-23 PROCEDURE — 80053 COMPREHEN METABOLIC PANEL: CPT

## 2018-05-23 PROCEDURE — 80048 BASIC METABOLIC PNL TOTAL CA: CPT

## 2018-05-23 PROCEDURE — 84443 ASSAY THYROID STIM HORMONE: CPT

## 2018-05-23 PROCEDURE — 82746 ASSAY OF FOLIC ACID SERUM: CPT

## 2018-05-23 PROCEDURE — 81003 URINALYSIS AUTO W/O SCOPE: CPT

## 2018-05-23 PROCEDURE — 70450 CT HEAD/BRAIN W/O DYE: CPT

## 2018-12-12 NOTE — PROGRESS NOTE ADULT - PROBLEM SELECTOR PLAN 4
12-Dec-2018 20:16 12-Dec-2018 20:20 VTE: No HSQ in setting of ICH, SCDs    FULL CODE    Dispo: Admitted to [facility] for further neurological monitoring in setting of ICH and monitoring CIWA for concern of potential ETOH withdrawal.

## 2019-12-06 NOTE — PATIENT PROFILE ADULT. - FUNCTIONAL SCREEN CURRENT LEVEL: AMBULATION, MLM
Pharmacy called to inform us that [facility]'s ranitidine is on back order due to the recall. Asked for an alternative medications. Pepcid was prescribed.
(2) assistive person

## 2022-04-21 NOTE — H&P ADULT - NSHPPHYSICALEXAM_GEN_ALL_CORE
Pt arrived with c/o lower abd pain since this AM.  Pain sharp in nature and has lasted throughout the day, worse when ambulating.  Pt denies any nausea, diarrhea, or fever.  Lasted BM today, pt report it was normal.  Denies any urinary symptoms.  Pt reports no changes in appetite, eating does not worsen pain.  Pt has hx of colitis.   .  VITAL SIGNS:  T(C): 36.7 (04-23-18 @ 14:33), Max: 37.4 (04-23-18 @ 11:18)  T(F): 98.1 (04-23-18 @ 14:33), Max: 99.3 (04-23-18 @ 11:18)  HR: 104 (04-23-18 @ 14:24) (68 - 117)  BP: 119/77 (04-23-18 @ 14:24) (102/61 - 128/78)  BP(mean): --  RR: 16 (04-23-18 @ 14:24) (16 - 19)  SpO2: 96% (04-23-18 @ 14:24) (94% - 100%)  Wt(kg): --    PHYSICAL EXAM:    Constitutional: WDWN resting comfortably in bed; NAD  Head: NC/AT  Eyes: PERRL, EOMI, anicteric sclera  ENT: no nasal discharge; uvula midline, no oropharyngeal erythema or exudates; MMM  Neck: supple; no JVD or thyromegaly  Respiratory: CTA B/L; no W/R/R, no retractions  Cardiac: +S1/S2; RRR; no M/R/G; PMI non-displaced  Gastrointestinal: soft, NT/ND; no rebound or guarding; +BSx4  Genitourinary: normal external genitalia  Back: spine midline, no bony tenderness or step-offs; no CVAT B/L  Extremities: WWP, no clubbing or cyanosis; no peripheral edema  Musculoskeletal: NROM x4; no joint swelling, tenderness or erythema  Vascular: 2+ radial, femoral, DP/PT pulses B/L  Dermatologic: skin warm, dry and intact; no rashes, wounds, or scars  Lymphatic: no submandibular or cervical LAD  Neurologic: AAOx3; CNII-XII grossly intact; no focal deficits  Psychiatric: affect and characteristics of appearance, verbalizations, behaviors are appropriate .  VITAL SIGNS:  T(C): 36.7 (04-23-18 @ 14:33), Max: 37.4 (04-23-18 @ 11:18)  T(F): 98.1 (04-23-18 @ 14:33), Max: 99.3 (04-23-18 @ 11:18)  HR: 104 (04-23-18 @ 14:24) (68 - 117)  BP: 119/77 (04-23-18 @ 14:24) (102/61 - 128/78)  BP(mean): --  RR: 16 (04-23-18 @ 14:24) (16 - 19)  SpO2: 96% (04-23-18 @ 14:24) (94% - 100%)  Wt(kg): --    PHYSICAL EXAM:    Constitutional: WDWN resting comfortably in bed; NAD  Head: NC/AT  Eyes: PERRL, EOMI, anicteric sclera  ENT: no nasal discharge; uvula midline, no oropharyngeal erythema or exudates; MMM  Neck: supple; no JVD or thyromegaly  Respiratory: CTA B/L; no W/R/R, no retractions  Cardiac: +S1/S2; sinus tachycardia but RR; no M/R/G;    Gastrointestinal: soft, NT/ND; no rebound or guarding; +BSx4  Genitourinary: normal external genitalia  Back: spine midline, no bony tenderness or step-offs; no CVAT B/L  Extremities: WWP, no clubbing or cyanosis; no peripheral edema  Musculoskeletal: NROM x4; no joint swelling, tenderness or erythema  Vascular: 2+ radial, femoral, DP/PT pulses B/L  Dermatologic: skin warm, dry and intact; no rashes, wounds, or scars  Lymphatic: no submandibular or cervical LAD  Neurologic: AAOx2; CNII-XII +right arm weakness   Psychiatric: affect and characteristics of appearance, verbalizations, behaviors are appropriate